# Patient Record
Sex: FEMALE | Race: WHITE | Employment: FULL TIME | URBAN - METROPOLITAN AREA
[De-identification: names, ages, dates, MRNs, and addresses within clinical notes are randomized per-mention and may not be internally consistent; named-entity substitution may affect disease eponyms.]

---

## 2020-03-04 ENCOUNTER — HOSPITAL ENCOUNTER (EMERGENCY)
Age: 30
Discharge: HOME | End: 2020-03-04
Attending: EMERGENCY MEDICINE
Payer: COMMERCIAL

## 2020-03-04 VITALS
HEART RATE: 84 BPM | SYSTOLIC BLOOD PRESSURE: 134 MMHG | RESPIRATION RATE: 14 BRPM | WEIGHT: 140 LBS | OXYGEN SATURATION: 98 % | HEIGHT: 64 IN | BODY MASS INDEX: 23.9 KG/M2 | DIASTOLIC BLOOD PRESSURE: 63 MMHG | TEMPERATURE: 97.9 F

## 2020-03-04 LAB
BACTERIA: ABNORMAL /HPF
BILIRUBIN URINE: NEGATIVE MG/DL
BLOOD, URINE: NEGATIVE
CAST TYPE: ABNORMAL /HPF
CLARITY: ABNORMAL
COLOR: YELLOW
COMMENT UA: NEGATIVE
CRYSTAL TYPE: ABNORMAL /HPF
EPITHELIAL CELLS, UA: 25 /HPF
GLUCOSE, URINE: NEGATIVE MG/DL
INTERPRETATION: NORMAL
KETONES, URINE: NEGATIVE MG/DL
LEUKOCYTE ESTERASE, URINE: ABNORMAL
NITRITE URINE, QUANTITATIVE: POSITIVE
PH, URINE: 5 (ref 5–8)
PREGNANCY, URINE: NEGATIVE
PROTEIN UA: NEGATIVE MG/DL
RBC URINE: 2 /HPF (ref 0–6)
SPECIFIC GRAVITY UA: 1.02 (ref 1–1.03)
SPECIFIC GRAVITY, URINE: 1.02 (ref 1–1.03)
UROBILINOGEN, URINE: 0.2 MG/DL (ref 0.2–1)
WBC UA: 5 /HPF (ref 0–5)

## 2020-03-04 PROCEDURE — 81001 URINALYSIS AUTO W/SCOPE: CPT

## 2020-03-04 PROCEDURE — 81025 URINE PREGNANCY TEST: CPT

## 2020-03-04 PROCEDURE — 99284 EMERGENCY DEPT VISIT MOD MDM: CPT

## 2020-03-04 RX ORDER — NITROFURANTOIN 25; 75 MG/1; MG/1
100 CAPSULE ORAL 2 TIMES DAILY
Qty: 14 CAPSULE | Refills: 0 | Status: SHIPPED | OUTPATIENT
Start: 2020-03-04 | End: 2020-03-11

## 2020-03-04 ASSESSMENT — PAIN SCALES - GENERAL: PAINLEVEL_OUTOF10: 5

## 2020-03-04 ASSESSMENT — PAIN DESCRIPTION - ORIENTATION: ORIENTATION: LEFT;LOWER

## 2020-03-04 ASSESSMENT — PAIN DESCRIPTION - PAIN TYPE: TYPE: ACUTE PAIN

## 2020-03-04 ASSESSMENT — PAIN DESCRIPTION - LOCATION: LOCATION: ABDOMEN

## 2020-03-04 NOTE — ED PROVIDER NOTES
Triage Chief Complaint:   Abdominal Pain (llq abd pain, hx of ectopic pregnancies)      [place]:  Barbara Robles is a 31 y.o. female that presents for:    Chief complaint:  Abdominal pain and urinary hesitancy. Location:  Left lower quadrant    Quality/Characteristic:  Feels like cramping with resemblance to when she had a prior ectopic pregnancy. Duration:  Pain has for the past 10 days. Aggravating/Alleviating factors:  States that she has her bilateral tubes tied, however has had 2 pregnancies since then. Tells me that she will occasionally have abnormal periods in timing as well as flow. Last menstruation was on 13 February and was lighter than usual however was the typical 3 to 4 days. Associated symptoms:  Denies any fevers, chest pain, shortness of breath, nausea, vomiting, diarrhea, vaginal complaints. Severity:  Pain is mild. Review of medical records: Old records for review. ROS:    Constitutional: No fevers/chills. No weight changes. No night sweats. Eyes:  No blurry vision or double vision. No drainage. Ears, Nose, Throat:  No hearing changes. No rhinitis. No sore throat or cough. Cardiac: No chest pain or pressure. No arm/jaw pain. No palpitations. No lightheadedness. No claudication symptoms. Respiratory:  No dyspnea. No hemoptysis. GI: + Left lower quadrant abdominal pain. No n/v/d. No hematochezia or melena. :  No hematuria.  + Urinary hesitancy and dysuria. No discharge. MSK:  No joint pain or swelling. No lower extremity swelling. Skin:  No rashes. No pruritis. Neuro:  No numbness, tingling or weakness in any extremity or face. No headache. No incoordination. No speech difficulties. No seizures. At least 10 point systems reviewed and otherwise acutely negative except as in the [address] Past Medical History:   Diagnosis Date    Ectopic pregnancy      History reviewed. No pertinent surgical history. History reviewed.  No pertinent family history. Social History     Socioeconomic History    Marital status:      Spouse name: Not on file    Number of children: Not on file    Years of education: Not on file    Highest education level: Not on file   Occupational History    Not on file   Social Needs    Financial resource strain: Not on file    Food insecurity:     Worry: Not on file     Inability: Not on file    Transportation needs:     Medical: Not on file     Non-medical: Not on file   Tobacco Use    Smoking status: Never Smoker    Smokeless tobacco: Never Used   Substance and Sexual Activity    Alcohol use: Never    Drug use: Never    Sexual activity: Never   Lifestyle    Physical activity:     Days per week: Not on file     Minutes per session: Not on file    Stress: Not on file   Relationships    Social connections:     Talks on phone: Not on file     Gets together: Not on file     Attends Zoroastrian service: Not on file     Active member of club or organization: Not on file     Attends meetings of clubs or organizations: Not on file     Relationship status: Not on file    Intimate partner violence:     Fear of current or ex partner: Not on file     Emotionally abused: Not on file     Physically abused: Not on file     Forced sexual activity: Not on file   Other Topics Concern    Not on file   Social History Narrative    Not on file     No current facility-administered medications for this encounter.       Current Outpatient Medications   Medication Sig Dispense Refill    nitrofurantoin, macrocrystal-monohydrate, (MACROBID) 100 MG capsule Take 1 capsule by mouth 2 times daily for 7 days 14 capsule 0     No Known Allergies  Nursing Notes Reviewed    Physical Exam:  ED Triage Vitals [03/04/20 1100]   Enc Vitals Group      /63      Pulse 84      Resp 14      Temp 97.9 °F (36.6 °C)      Temp Source Oral      SpO2 98 %      Weight 140 lb (63.5 kg)      Height 5' 4\" (1.626 m)      Head Circumference       Peak Flow Pain Score       Pain Loc       Pain Edu? Excl. in [address]? GENERAL APPEARANCE: alert, lying supine in gurney, cooperative with exam.  HEAD: normocephalic, atraumatic  EYES:  EOMI, conjunctiva clear. NOSE: no nasal discharge. MOUTH: moist mucous membranes  NECK: supple, no neck tenderness, normal ROM, no JVD  BACK: no back tenderness. no CVA tenderness  LUNGS: no wheezing, no rales, no rhonchi, no accessory muscle use. good air  exchange bilateral.  HEART: normal rate, normal rhythm, no murmur, no rub. ABDOMEN: normal appearance, normal BS, soft, no abd tenderness, no guarding, no organomegaly, no abd masses. RECTAL: deffered  VAGINAL: Patient declines. EXTREMITIES: no joint swelling\tenderness, no edema, normal ROM. SKIN: warm, dry, good color, no rash. NEURO: Alert and oriented, motor intact, sensory intact.     I have reviewed and interpreted all of the currently available lab results from this visit (if applicable):  Results for orders placed or performed during the hospital encounter of 03/04/20   Urinalysis, reflex to microscopic   Result Value Ref Range    Color, UA YELLOW YELLOW    Clarity, UA CLOUDY (A) CLEAR    Glucose, Urine NEGATIVE NEGATIVE MG/DL    Bilirubin Urine NEGATIVE NEGATIVE MG/DL    Ketones, Urine NEGATIVE NEGATIVE MG/DL    Specific Gravity, UA 1.020 1.001 - 1.035    Blood, Urine NEGATIVE NEGATIVE    pH, Urine 5.0 5.0 - 8.0    Protein, UA NEGATIVE NEGATIVE MG/DL    Urobilinogen, Urine 0.2 0.2 - 1.0 MG/DL    Nitrite Urine, Quantitative POSITIVE (A) NEGATIVE    Leukocyte Esterase, Urine SMALL NUMBER OR AMOUNT OBSERVED (A) NEGATIVE    RBC, UA 2 0 - 6 /HPF    WBC, UA 5 0 - 5 /HPF    Epithelial Cells, UA 25 /HPF    Cast Type NO CAST FORMS SEEN NO CAST FORMS SEEN /HPF    Bacteria, UA LARGE NUMBER OR AMOUNT OBSERVED (A) NEGATIVE /HPF    Crystal Type NO CELLS SEEN (A) NEGATIVE /HPF    Urinalysis Comments NEGATIVE    HCG, Urine, Qualitative, Pregnancy (Lab)   Result Value Ref Range Pregnancy, Urine NEGATIVE NEGATIVE    Specific Gravity, Urine 1.020 1.001 - 1.035    Interpretation       HCG METHOD LIMITATIONS:  Very dilute specimens, as indicated  by low specific gravity, may have  insufficient concentration of HCG  to bring about a positive result. Radiographs:  [] Radiologist's Report Reviewed:   No orders to display       EKG: (All EKG's are interpreted by myself in the absence of a cardiologist)    MDM:  11:03 AM stat u/a, UPT ordered. 11:47 AM urinalysis reveals a large number of bacteria as well as inflammatory markers and nitrite as well as leukocytes. No blood. We will treat her for UTI with Macrobid. Her pregnancy test is negative, I did explain that despite ectopic pregnancy being ruled out there is a more broad differential of left lower quadrant pain to include but not limited to diverticulitis, ovarian mass, ovarian torsion, appendicitis, which would require further imaging and work-up such as transvaginal ultrasound and CT imaging of the abdomen and pelvis with IV contrast however the patient refuses. She understands we cannot rule out these life-threatening other diagnoses and agrees to return to the ER for any worsening symptoms in any way. In the interim she will take Tylenol and ibuprofen as needed for discomfort and follow-up with her primary care physician within 48 hours. Final Impression:  1. Urinary tract infection without hematuria, site unspecified    2. Left lower quadrant abdominal pain        Discharge referral (if applicable): Follow-up with your primary care physician or return to the ER for any worsening symptoms in any way.   In 2 days        Discharge medications (if applicable):  New Prescriptions    NITROFURANTOIN, MACROCRYSTAL-MONOHYDRATE, (MACROBID) 100 MG CAPSULE    Take 1 capsule by mouth 2 times daily for 7 days       (Please note that portions of this note may have been completed with a voice recognition program. Efforts were made to edit the dictations but occasionally words are mis-transcribed.)    MD Rashmi Hernadez MD  03/04/20 5628